# Patient Record
Sex: FEMALE | Race: WHITE | Employment: OTHER | URBAN - METROPOLITAN AREA
[De-identification: names, ages, dates, MRNs, and addresses within clinical notes are randomized per-mention and may not be internally consistent; named-entity substitution may affect disease eponyms.]

---

## 2017-03-31 PROBLEM — D64.9 NORMOCYTIC ANEMIA: Status: ACTIVE | Noted: 2017-03-31

## 2017-03-31 PROBLEM — Z71.9 ENCOUNTER FOR CONSULTATION: Status: ACTIVE | Noted: 2017-03-31

## 2017-06-04 PROBLEM — D53.9 MACROCYTIC ANEMIA: Status: ACTIVE | Noted: 2017-06-04

## 2017-06-04 PROBLEM — Z09 FOLLOW-UP EXAM: Status: ACTIVE | Noted: 2017-06-04

## 2017-10-21 PROBLEM — F41.9 ANXIETY: Status: ACTIVE | Noted: 2017-10-21

## 2017-10-21 PROBLEM — S32.10XA CLOSED FRACTURE OF SACRUM (HCC): Status: ACTIVE | Noted: 2017-10-21

## 2017-10-21 PROBLEM — S32.9XXA PELVIC FRACTURE (HCC): Status: ACTIVE | Noted: 2017-10-21

## 2017-10-21 PROBLEM — S32.591A INFERIOR PUBIC RAMUS FRACTURE, RIGHT, CLOSED, INITIAL ENCOUNTER (HCC): Status: ACTIVE | Noted: 2017-10-21

## 2017-10-21 PROBLEM — S32.511A CLOSED FRACTURE OF RIGHT SUPERIOR PUBIC RAMUS (HCC): Status: ACTIVE | Noted: 2017-10-21

## 2017-10-21 PROBLEM — I10 HTN (HYPERTENSION): Status: ACTIVE | Noted: 2017-10-21

## 2017-12-21 ENCOUNTER — TELEPHONE (OUTPATIENT)
Dept: CARDIOLOGY CLINIC | Age: 82
End: 2017-12-21

## 2017-12-21 NOTE — TELEPHONE ENCOUNTER
Susan's daughter Abundio Greco [facility]) is calling stating her mother has a New  Patient Appt to see Dr. Dank Moulton 2/12/18- Dr. Vivi Dubon first available. Abundio Greco is a current patient of Dr. Dank Moulton and is why she wants her mother to see Dr. Dank Moulton as well. Abundio Greco is stating that her mother is having horrible nightmares night terrors and thinks it is because of the medication she is taking for her blood pressure 1) losartan 2) amlodipine. PCP took Yin Bynum off of the losartan as of yesterday. Can blood pressure medication give nightmares? Can we get Yin Bynum in any sooner?   Dr. Dank Moulton has 12/26/17 appts blocked in [address] and are marked  \" As needed\"      Pricilacathy Lorenzoles can be reached at [phone]

## 2017-12-22 NOTE — TELEPHONE ENCOUNTER
Called Alex back again today to follow up and spoke to her . He stated that the pt is now hospitalized and that Alex is with her now at the hospital. She has been admitted to [facility]. I told him to have her call our office at her earliest convenience. Pt has a new patient appointment to see Dr. Mamadou Crawford on 02/12/18.     Spoke to [employer], RN

## 2018-09-26 PROBLEM — Z09 FOLLOW-UP EXAM: Status: RESOLVED | Noted: 2017-06-04 | Resolved: 2018-09-26

## 2019-05-07 ENCOUNTER — HOSPITAL ENCOUNTER (EMERGENCY)
Age: 84
Discharge: HOME | End: 2019-05-07
Attending: EMERGENCY MEDICINE
Payer: MEDICARE

## 2019-05-07 ENCOUNTER — APPOINTMENT (OUTPATIENT)
Dept: CT IMAGING | Age: 84
End: 2019-05-07
Payer: MEDICARE

## 2019-05-07 VITALS
HEART RATE: 68 BPM | HEIGHT: 61 IN | BODY MASS INDEX: 21.69 KG/M2 | RESPIRATION RATE: 17 BRPM | DIASTOLIC BLOOD PRESSURE: 56 MMHG | SYSTOLIC BLOOD PRESSURE: 182 MMHG | TEMPERATURE: 98.6 F | OXYGEN SATURATION: 96 % | WEIGHT: 114.86 LBS

## 2019-05-07 DIAGNOSIS — E86.0 DEHYDRATION: ICD-10-CM

## 2019-05-07 DIAGNOSIS — R53.1 GENERAL WEAKNESS: Primary | ICD-10-CM

## 2019-05-07 LAB
A/G RATIO: 1.4 (ref 1.1–2.2)
ABO/RH: NORMAL
ALBUMIN SERPL-MCNC: 4.5 G/DL (ref 3.4–5)
ALP BLD-CCNC: 64 U/L (ref 40–129)
ALT SERPL-CCNC: 20 U/L (ref 10–40)
ANION GAP SERPL CALCULATED.3IONS-SCNC: 11 MMOL/L (ref 3–16)
ANTIBODY SCREEN: NORMAL
AST SERPL-CCNC: 24 U/L (ref 15–37)
BASOPHILS ABSOLUTE: 0.1 K/UL (ref 0–0.2)
BASOPHILS RELATIVE PERCENT: 1 %
BILIRUB SERPL-MCNC: 0.5 MG/DL (ref 0–1)
BILIRUBIN URINE: NEGATIVE
BLOOD, URINE: NEGATIVE
BUN BLDV-MCNC: 14 MG/DL (ref 7–20)
CALCIUM SERPL-MCNC: 9.2 MG/DL (ref 8.3–10.6)
CHLORIDE BLD-SCNC: 104 MMOL/L (ref 99–110)
CLARITY: CLEAR
CO2: 27 MMOL/L (ref 21–32)
COLOR: YELLOW
CREAT SERPL-MCNC: 0.6 MG/DL (ref 0.6–1.2)
EOSINOPHILS ABSOLUTE: 0.5 K/UL (ref 0–0.6)
EOSINOPHILS RELATIVE PERCENT: 7 %
GFR AFRICAN AMERICAN: >60
GFR NON-AFRICAN AMERICAN: >60
GLOBULIN: 3.2 G/DL
GLUCOSE BLD-MCNC: 113 MG/DL (ref 70–99)
GLUCOSE URINE: NEGATIVE MG/DL
HCT VFR BLD CALC: 28 % (ref 36–48)
HEMOGLOBIN: 9.5 G/DL (ref 12–16)
KETONES, URINE: NEGATIVE MG/DL
LEUKOCYTE ESTERASE, URINE: NEGATIVE
LYMPHOCYTES ABSOLUTE: 2.7 K/UL (ref 1–5.1)
LYMPHOCYTES RELATIVE PERCENT: 37.3 %
MCH RBC QN AUTO: 36.7 PG (ref 26–34)
MCHC RBC AUTO-ENTMCNC: 34 G/DL (ref 31–36)
MCV RBC AUTO: 108.1 FL (ref 80–100)
MICROSCOPIC EXAMINATION: NORMAL
MONOCYTES ABSOLUTE: 0.6 K/UL (ref 0–1.3)
MONOCYTES RELATIVE PERCENT: 8.9 %
NEUTROPHILS ABSOLUTE: 3.3 K/UL (ref 1.7–7.7)
NEUTROPHILS RELATIVE PERCENT: 45.8 %
NITRITE, URINE: NEGATIVE
PDW BLD-RTO: 15 % (ref 12.4–15.4)
PH UA: 6.5 (ref 5–8)
PLATELET # BLD: 382 K/UL (ref 135–450)
PMV BLD AUTO: 9 FL (ref 5–10.5)
POTASSIUM SERPL-SCNC: 3.8 MMOL/L (ref 3.5–5.1)
PROTEIN UA: NEGATIVE MG/DL
RBC # BLD: 2.59 M/UL (ref 4–5.2)
SODIUM BLD-SCNC: 142 MMOL/L (ref 136–145)
SPECIFIC GRAVITY UA: 1.01 (ref 1–1.03)
TOTAL PROTEIN: 7.7 G/DL (ref 6.4–8.2)
TROPONIN: <0.01 NG/ML
URINE REFLEX TO CULTURE: NORMAL
URINE TYPE: NORMAL
UROBILINOGEN, URINE: 1 E.U./DL
WBC # BLD: 7.2 K/UL (ref 4–11)

## 2019-05-07 PROCEDURE — 70450 CT HEAD/BRAIN W/O DYE: CPT

## 2019-05-07 PROCEDURE — 84484 ASSAY OF TROPONIN QUANT: CPT

## 2019-05-07 PROCEDURE — 80053 COMPREHEN METABOLIC PANEL: CPT

## 2019-05-07 PROCEDURE — 96360 HYDRATION IV INFUSION INIT: CPT

## 2019-05-07 PROCEDURE — 81003 URINALYSIS AUTO W/O SCOPE: CPT

## 2019-05-07 PROCEDURE — 85025 COMPLETE CBC W/AUTO DIFF WBC: CPT

## 2019-05-07 PROCEDURE — 86850 RBC ANTIBODY SCREEN: CPT

## 2019-05-07 PROCEDURE — 99284 EMERGENCY DEPT VISIT MOD MDM: CPT

## 2019-05-07 PROCEDURE — 86901 BLOOD TYPING SEROLOGIC RH(D): CPT

## 2019-05-07 PROCEDURE — 93005 ELECTROCARDIOGRAM TRACING: CPT | Performed by: EMERGENCY MEDICINE

## 2019-05-07 PROCEDURE — 86900 BLOOD TYPING SEROLOGIC ABO: CPT

## 2019-05-07 PROCEDURE — 2580000003 HC RX 258: Performed by: EMERGENCY MEDICINE

## 2019-05-07 RX ORDER — 0.9 % SODIUM CHLORIDE 0.9 %
250 INTRAVENOUS SOLUTION INTRAVENOUS ONCE
Status: COMPLETED | OUTPATIENT
Start: 2019-05-07 | End: 2019-05-07

## 2019-05-07 RX ADMIN — SODIUM CHLORIDE 250 ML: 9 INJECTION, SOLUTION INTRAVENOUS at 17:18

## 2019-05-07 NOTE — ED NOTES
Patient requesting not to have a male nurse. Charge nurse made aware. Pt placed on bedpan by female RN.      Jessica Oneill RN  05/07/19 [id number]

## 2019-05-07 NOTE — ED NOTES
Pt presents to ED for complaints of dizziness. Pt states that dizziness has been going on for past couple of weeks. Pt's daughter reports that she noticed that her mother look pale today and decided to have her evaluated. Pt currently lives in independent living. Pt hypertensive at this time. NAD noted. Call light within reach. Daughter at bedside.      Lissa Garcia RN  05/07/19 [id number]

## 2019-05-07 NOTE — ED PROVIDER NOTES
Triage Chief Complaint:   Fatigue (Patient states she has had dizziness x 2-3 weeks that has gradually worsened. Patient states she also feels very fatigued.)    [place]:  Arlyn Dumont is a 80 y.o. female that presents  Her increasing generalized weakness and fatigue with lightheadedness for the past 2-3 weeks. She has a history of anemia and her daughter states  That she looks more pale than usual.  She has no unilateral weakness but doesn't or some urinary frequency. She denies dysuria, chest pain, cough, headache, shortness of breath, and only takes Aricept. No other medications. She has not had any nausea or vommiting. ROS:  At least 10 systems reviewed and otherwise acutely negative except as in the [address]     Past Medical History:   Diagnosis Date    Anemia     B12 deficiency     per patient    Fatigue     Hypertension     Macrocytosis     Mitral regurgitation     moderate    Multiple myeloma (HCC)      Past Surgical History:   Procedure Laterality Date    BARTHOLIN GLAND CYST EXCISION  1973    CATARACT REMOVAL Bilateral     TONSILLECTOMY       Family History   Problem Relation Age of Onset    Hypertension Daughter     Coronary Art Dis Mother      Social History     Socioeconomic History    Marital status:      Spouse name: Not on file    Number of children: Not on file    Years of education: Not on file    Highest education level: Not on file   Occupational History    Not on file   Social Needs    Financial resource strain: Not on file    Food insecurity:     Worry: Not on file     Inability: Not on file    Transportation needs:     Medical: Not on file     Non-medical: Not on file   Tobacco Use    Smoking status: Never Smoker    Smokeless tobacco: Never Used   Substance and Sexual Activity    Alcohol use: No    Drug use: No    Sexual activity: Not on file   Lifestyle    Physical activity:     Days per week: Not on file     Minutes per session: Not on file    Stress: Not on file   Relationships    Social connections:     Talks on phone: Not on file     Gets together: Not on file     Attends Jehovah's witness service: Not on file     Active member of club or organization: Not on file     Attends meetings of clubs or organizations: Not on file     Relationship status: Not on file    Intimate partner violence:     Fear of current or ex partner: Not on file     Emotionally abused: Not on file     Physically abused: Not on file     Forced sexual activity: Not on file   Other Topics Concern    Not on file   Social History Narrative    Not on file     No current facility-administered medications for this encounter. Current Outpatient Medications   Medication Sig Dispense Refill    losartan (COZAAR) 100 MG tablet Take 1 tablet by mouth daily 30 tablet 3    amLODIPine (NORVASC) 10 MG tablet Take 1 tablet by mouth daily 30 tablet 3    Lactobacillus-Inulin ([facility] PO) Take by mouth daily      Coenzyme Q10 (COQ10) 50 MG CAPS Take by mouth 2 times daily      multivitamin (THERAGRAN) per tablet Take 1 tablet by mouth daily. No Known Allergies            Nursing Notes Reviewed    Physical Exam:  Vitals:    05/07/19 1915   BP: (!) 182/56   Pulse: 68   Resp: 17   Temp:    SpO2: 96%       GENERAL APPEARANCE: Awake and alert. Cooperative. No acute distress. HEAD: Normocephalic. Atraumatic. EYES: EOM's grossly intact. Sclera anicteric. ENT: Mucous membranes are moist. Tolerates saliva. No trismus. NECK: Supple. No meningismus. Trachea midline. HEART: RRR. Radial pulses 2+. LUNGS: Respirations unlabored. CTAB  ABDOMEN: Soft. Non-tender. No guarding or rebound. EXTREMITIES: No acute deformities. SKIN: Warm and dry. There is some pallor to the skin. NEUROLOGICAL: No gross facial drooping. Moves all 4 extremities spontaneously. 4+ strength in the upper and lower extremities. No diminished sensation in the face, arms, or legs. PSYCHIATRIC: Normal mood.     I have reviewed and interpreted all of the currently available lab results from this visit (if applicable):  Results for orders placed or performed during the hospital encounter of 05/07/19   Troponin   Result Value Ref Range    Troponin <0.01 <0.01 ng/mL   CBC Auto Differential   Result Value Ref Range    WBC 7.2 4.0 - 11.0 K/uL    RBC 2.59 (L) 4.00 - 5.20 M/uL    Hemoglobin 9.5 (L) 12.0 - 16.0 g/dL    Hematocrit 28.0 (L) 36.0 - 48.0 %    .1 (H) 80.0 - 100.0 fL    MCH 36.7 (H) 26.0 - 34.0 pg    MCHC 34.0 31.0 - 36.0 g/dL    RDW 15.0 12.4 - 15.4 %    Platelets 330 676 - 473 K/uL    MPV 9.0 5.0 - 10.5 fL    Neutrophils % 45.8 %    Lymphocytes % 37.3 %    Monocytes % 8.9 %    Eosinophils % 7.0 %    Basophils % 1.0 %    Neutrophils # 3.3 1.7 - 7.7 K/uL    Lymphocytes # 2.7 1.0 - 5.1 K/uL    Monocytes # 0.6 0.0 - 1.3 K/uL    Eosinophils # 0.5 0.0 - 0.6 K/uL    Basophils # 0.1 0.0 - 0.2 K/uL   Comprehensive Metabolic Panel   Result Value Ref Range    Sodium 142 136 - 145 mmol/L    Potassium 3.8 3.5 - 5.1 mmol/L    Chloride 104 99 - 110 mmol/L    CO2 27 21 - 32 mmol/L    Anion Gap 11 3 - 16    Glucose 113 (H) 70 - 99 mg/dL    BUN 14 7 - 20 mg/dL    CREATININE 0.6 0.6 - 1.2 mg/dL    GFR Non-African American >60 >60    GFR African American >60 >60    Calcium 9.2 8.3 - 10.6 mg/dL    Total Protein 7.7 6.4 - 8.2 g/dL    Alb 4.5 3.4 - 5.0 g/dL    Albumin/Globulin Ratio 1.4 1.1 - 2.2    Total Bilirubin 0.5 0.0 - 1.0 mg/dL    Alkaline Phosphatase 64 40 - 129 U/L    ALT 20 10 - 40 U/L    AST 24 15 - 37 U/L    Globulin 3.2 g/dL   Urine, reflex to culture   Result Value Ref Range    Color, UA YELLOW Straw/Yellow    Clarity, UA Clear Clear    Glucose, Ur Negative Negative mg/dL    Bilirubin Urine Negative Negative    Ketones, Urine Negative Negative mg/dL    Specific Gravity, UA 1.014 1.005 - 1.030    Blood, Urine Negative Negative    pH, UA 6.5 5.0 - 8.0    Protein, UA Negative Negative mg/dL    Urobilinogen, Urine 1.0 <2.0 E. U./dL    Nitrite, Urine Negative Negative    Leukocyte Esterase, Urine Negative Negative    Microscopic Examination Not Indicated     Urine Reflex to Culture Not Indicated     Urine Type Not Specified    TYPE AND SCREEN   Result Value Ref Range    ABO/Rh B POS     Antibody Screen NEG         Radiographs (if obtained):  [] The following radiograph was interpreted by myself in the absence of a radiologist:  [x] Radiologist's Report Reviewed:  Ct Head Wo Contrast    Result Date: 5/7/2019  EXAMINATION: CT OF THE HEAD WITHOUT CONTRAST  5/7/2019 6:25 pm TECHNIQUE: CT of the head was performed without the administration of intravenous contrast. Dose modulation, iterative reconstruction, and/or weight based adjustment of the mA/kV was utilized to reduce the radiation dose to as low as reasonably achievable. COMPARISON: None. HISTORY: ORDERING SYSTEM PROVIDED HISTORY: generalized weakness TECHNOLOGIST PROVIDED HISTORY: Has a \"code stroke\" or \"stroke alert\" been called? ->No Ordering Physician Provided Reason for Exam: (Patient states she has had dizziness x 2-3 weeks that has gradually worsened. Patient states she also feels very fatigued Acuity: Acute Type of Exam: Initial FINDINGS: BRAIN/VENTRICLES: Ventricles and sulci are mildly prominent. Mild small vessel ischemic changes are noted in the white matter. There is no hemorrhage or extra-axial collection ORBITS: The visualized portion of the orbits demonstrate no acute abnormality. SINUSES: The visualized paranasal sinuses and mastoid air cells demonstrate no acute abnormality. SOFT TISSUES/SKULL:  No acute abnormality of the visualized skull or soft tissues. Mild small vessel ischemic changes No acute abnormality otherwise     EKG (if obtained): (All EKG's are interpreted by myself in the absence of a cardiologist)  Initial EKG on my interpretation shows  Sinus rhythm with first-degree AV block. Occasional PVC present.  Rate of 78 bpm. No significant ST or T-wave consuelo. Compared to EKG from October 27, 2017 there are no significant changes. MDM:  Differential diagnosis:   Possible anemia versus urinate tract infection versus atypical ACS versus metabolic abnormality. Screening labs and urinalysis ordered. EKG is unremarkable. Disposition pending workup. Old records reviewed. Labs and imaging reviewed and results discussed with patient. She had unremarkable lab workup. She does have chronic anemia, but it is  Unchanged. After small fluid bolus she states she feels much better. Her daughter states that she probably has not been drinking enough water at home and she was concerned her mother may be dehydrated. The patient was able to a minute without any assistance and states that her strength is significantly improved now. She'll be discharged home with close outpatient follow-up. Patient was given scripts for the following medications. I counseled patient how to take these medications. New Prescriptions    No medications on file     Clinical Impression:  1. General weakness    2.  Dehydration       (Please note that portions of this note may have been completed with a voice recognition program. Efforts were made to edit the dictations but occasionally words are mis-transcribed.)    MD Blayne Huffman MD  05/07/19 2006

## 2019-05-08 LAB
EKG ATRIAL RATE: 78 BPM
EKG DIAGNOSIS: NORMAL
EKG P AXIS: 13 DEGREES
EKG P-R INTERVAL: 214 MS
EKG Q-T INTERVAL: 388 MS
EKG QRS DURATION: 68 MS
EKG QTC CALCULATION (BAZETT): 442 MS
EKG R AXIS: -4 DEGREES
EKG T AXIS: 9 DEGREES
EKG VENTRICULAR RATE: 78 BPM

## 2019-05-08 PROCEDURE — 93010 ELECTROCARDIOGRAM REPORT: CPT | Performed by: INTERNAL MEDICINE

## 2019-05-08 NOTE — ED NOTES
Assisted patient in ambulating per doctor request. Patient able to walk solo with a steady and even gait, denied any dizziness. Patient returned to laying in bed and doctor informed of result.       Jannette Jones, EMT-P  05/07/19 2005